# Patient Record
Sex: FEMALE | Race: WHITE | NOT HISPANIC OR LATINO
[De-identification: names, ages, dates, MRNs, and addresses within clinical notes are randomized per-mention and may not be internally consistent; named-entity substitution may affect disease eponyms.]

---

## 2020-09-17 PROBLEM — Z00.00 ENCOUNTER FOR PREVENTIVE HEALTH EXAMINATION: Status: ACTIVE | Noted: 2020-09-17

## 2020-09-18 ENCOUNTER — TRANSCRIPTION ENCOUNTER (OUTPATIENT)
Age: 65
End: 2020-09-18

## 2020-09-18 ENCOUNTER — APPOINTMENT (OUTPATIENT)
Dept: PHYSICAL MEDICINE AND REHAB | Facility: CLINIC | Age: 65
End: 2020-09-18
Payer: MEDICARE

## 2020-09-18 DIAGNOSIS — Z85.038 PERSONAL HISTORY OF OTHER MALIGNANT NEOPLASM OF LARGE INTESTINE: ICD-10-CM

## 2020-09-18 DIAGNOSIS — I10 ESSENTIAL (PRIMARY) HYPERTENSION: ICD-10-CM

## 2020-09-18 PROCEDURE — 99202 OFFICE O/P NEW SF 15 MIN: CPT

## 2020-09-18 PROCEDURE — 73502 X-RAY EXAM HIP UNI 2-3 VIEWS: CPT | Mod: RT

## 2020-09-18 RX ORDER — TAMSULOSIN HYDROCHLORIDE 0.4 MG/1
0.4 CAPSULE ORAL
Refills: 0 | Status: ACTIVE | COMMUNITY

## 2020-09-18 RX ORDER — LISINOPRIL 30 MG/1
TABLET ORAL
Refills: 0 | Status: ACTIVE | COMMUNITY

## 2020-09-18 NOTE — ASSESSMENT
[FreeTextEntry1] : 65 y.o. F w/ right lateral hip and groin pain consistent w/ gluteal tendinopathy and moderate-severe FA joint OA.  Rx meloxicam 15 mg; one tab po qd x 1-2 weeks as tolerated.  Pt. deferred Rx for P.T.  We discussed R/B/A to US guided RIGHT HIP JOINT CSI which patient has agreed to.  We will scan her lateral hip to document gluteal tendinopathy and then consider PNT vs. PRP injection.  RTC 1-2 weeks for CSI.

## 2020-09-18 NOTE — DATA REVIEWED
[Plain X-Rays] : plain X-Rays [FreeTextEntry1] : X-rays (2 views) right hip performed at today's office visit: c/w moderate-to-severe FA joint OA w/ loss of joint space and subchondral sclerosis.  No fx's.

## 2020-09-18 NOTE — HISTORY OF PRESENT ILLNESS
[FreeTextEntry1] : 65 y.o. F w/ h/o HTN and colon cancer (2018) s/p subtotal colectomy presents to office w/ c/o right hip pain radiating into groin and knee.  Pt. denies significant LBP.  Pain does not travel past knee.  Denies N/TW.  Pt. saw outside Ortho MD ([facility]) who ordered x-rays which showed "arthritis".  No NSAIDs.  Tried Aleve prn.  Pt. is doing P.T. for left frozen shoulder and is improving, but has not had P.T. for her hip.  No CSI.

## 2020-09-18 NOTE — PHYSICAL EXAM
[FreeTextEntry1] : NAD\par A&Ox3\par Non-obese\par ROM L-spine: near full forward flexion w/o pain; 20' passive extension w/o pain\par ROM Hips: slight restriction ER R HIP\par Pelvic tilt: ++\par Seated slump test: neg\par SLR: neg\par KEN's: +/- RIGHT w/ mild R HIP/GROIN PAIN\par DTR's: 2+ symmetric knees/ankles\par MMT: 5/5 b/l LE\par Sensation: SILT\par Toe & Heel Walk: Yes\par Palpation: R GT TTP (DISCORDANT); R GM TENDON INSERTION TTP (MORE CONCORDANT)\par

## 2020-10-19 ENCOUNTER — APPOINTMENT (OUTPATIENT)
Dept: PHYSICAL MEDICINE AND REHAB | Facility: CLINIC | Age: 65
End: 2020-10-19
Payer: MEDICARE

## 2020-10-19 VITALS
TEMPERATURE: 97.2 F | HEIGHT: 60 IN | BODY MASS INDEX: 25.91 KG/M2 | SYSTOLIC BLOOD PRESSURE: 155 MMHG | WEIGHT: 132 LBS | DIASTOLIC BLOOD PRESSURE: 93 MMHG | HEART RATE: 63 BPM

## 2020-10-19 PROCEDURE — 20611 DRAIN/INJ JOINT/BURSA W/US: CPT | Mod: RT

## 2020-11-10 ENCOUNTER — APPOINTMENT (OUTPATIENT)
Dept: PHYSICAL MEDICINE AND REHAB | Facility: CLINIC | Age: 65
End: 2020-11-10
Payer: MEDICARE

## 2020-11-10 VITALS
HEIGHT: 60 IN | SYSTOLIC BLOOD PRESSURE: 128 MMHG | DIASTOLIC BLOOD PRESSURE: 88 MMHG | BODY MASS INDEX: 27.09 KG/M2 | HEART RATE: 58 BPM | WEIGHT: 138 LBS

## 2020-11-10 VITALS — TEMPERATURE: 96.1 F

## 2020-11-10 PROCEDURE — 99213 OFFICE O/P EST LOW 20 MIN: CPT

## 2020-11-10 NOTE — HISTORY OF PRESENT ILLNESS
Addendum  created 03/02/17 1233 by Ren Suarez, DO    Delete clinical note       [FreeTextEntry1] : 65 y.o. F s/p USG right hip CSI with overall 80% improvement.  Report that back pain remains unchanged - still pain with navigating up and down stairs.  Pain still predominantly to right groin and glut.  Denies kennedi weakness, paresthesia, B/B incontinence, or saddle anesthesia.  Previously took meloxicam with benefit, currently takes Aleve PRN.

## 2020-11-10 NOTE — PHYSICAL EXAM
[FreeTextEntry1] : NAD\par A&Ox3\par Non-obese\par ROM L-spine: near full forward flexion w/o pain; 20' passive extension w/o pain\par ROM Hips: fluent IR/ER bilaterally - right slightly restricted compared to left with IR/ER\par Pelvic tilt: ++\par Seated slump test: neg\par SLR: neg\par KEN's: +/- RIGHT w/ mild R HIP/GROIN PAIN\par DTR's: 2+ symmetric knees/ankles\par MMT: 5/5 b/l LE\par Sensation: SILT\par Toe & Heel Walk: Yes\par Palpation: R GT TTP (DISCORDANT); R GM TENDON INSERTION TTP (MORE CONCORDANT)\par

## 2020-11-10 NOTE — ASSESSMENT
[FreeTextEntry1] : 65 y.o. F w/ right lateral hip and groin pain consistent w/ gluteal tendinopathy and moderate-severe FA joint OA.  s/p USG right HIp CSI with significant relief.  Discussed R/B/A to repeat US guided hip CSI vs. HA injection if needed.  Pt. would not be good candidate for PRP injection given advanced FA OA.  Pt. understands that she may ultimately need R KIA in future.  RTC 3 months.

## 2021-03-09 ENCOUNTER — APPOINTMENT (OUTPATIENT)
Dept: PHYSICAL MEDICINE AND REHAB | Facility: CLINIC | Age: 66
End: 2021-03-09
Payer: MEDICARE

## 2021-03-09 VITALS
HEIGHT: 60 IN | SYSTOLIC BLOOD PRESSURE: 131 MMHG | OXYGEN SATURATION: 99 % | HEART RATE: 70 BPM | BODY MASS INDEX: 26.7 KG/M2 | TEMPERATURE: 97.8 F | DIASTOLIC BLOOD PRESSURE: 80 MMHG | RESPIRATION RATE: 14 BRPM | WEIGHT: 136 LBS

## 2021-03-09 VITALS — TEMPERATURE: 97.8 F

## 2021-03-09 PROCEDURE — 99212 OFFICE O/P EST SF 10 MIN: CPT

## 2021-03-09 NOTE — ASSESSMENT
[FreeTextEntry1] : 65 y.o. F w/ right lateral hip and groin pain consistent w/ gluteal tendinopathy and moderate-severe FA joint OA s/p USG right hip CSI with good relief of sxs.  Again discussed R/B/A to repeat US guided hip CSI vs. HA injection if needed (not recommended  now).  Pt. would not be good candidate for PRP injection into FA joint given h/o advanced OA but remains option for GM tendon.  Pt. understands that she may ultimately need R KIA in future.  RTC 3 months.

## 2021-03-09 NOTE — HISTORY OF PRESENT ILLNESS
[FreeTextEntry1] : 65 y.o. F w/ h/o right lateral hip and groin pain consistent w/ gluteal tendinopathy and moderate-severe FA joint OA (s/p USG right HIp CSI 10/19/20 with significant relief) returns to office for f/u.  Pt. states that she still feels relief from October's CSI.  She continues to describe an "aching" discomfort in posterolateral right thigh.  Denies LBP or radiating right leg pain.  No N/T/B in legs/feet.

## 2021-03-09 NOTE — PHYSICAL EXAM
[FreeTextEntry1] : NAD\par A&Ox3\par Non-obese\par ROM L-spine: near full forward flexion w/o pain; 20' passive extension w/o pain\par ROM Hips: fluent IR/ER bilaterally - right slightly restricted compared to left with IR/ER\par Pelvic tilt: ++\par Seated slump test: neg\par SLR: neg\par KEN's: +/- RIGHT w/ mild R HIP/GROIN PAIN (static)\par DTR's: 2+ symmetric knees/ankles\par MMT: 5/5 b/l LE except 4/5 RIGHT HIP ABD\par Sensation: SILT\par Toe & Heel Walk: Yes\par Palpation: R SIJ NTTP; R GT TTP (DISCORDANT); R GM TENDON INSERTION TTP (MORE CONCORDANT)\par

## 2021-05-20 ENCOUNTER — TRANSCRIPTION ENCOUNTER (OUTPATIENT)
Age: 66
End: 2021-05-20

## 2021-06-11 ENCOUNTER — APPOINTMENT (OUTPATIENT)
Dept: PHYSICAL MEDICINE AND REHAB | Facility: CLINIC | Age: 66
End: 2021-06-11
Payer: MEDICARE

## 2021-06-11 VITALS
WEIGHT: 132 LBS | HEIGHT: 59 IN | DIASTOLIC BLOOD PRESSURE: 89 MMHG | TEMPERATURE: 97 F | BODY MASS INDEX: 26.61 KG/M2 | SYSTOLIC BLOOD PRESSURE: 144 MMHG | HEART RATE: 65 BPM | RESPIRATION RATE: 14 BRPM

## 2021-06-11 PROCEDURE — 99214 OFFICE O/P EST MOD 30 MIN: CPT

## 2021-06-11 NOTE — HISTORY OF PRESENT ILLNESS
[FreeTextEntry1] : 66 y.o. F w/ right lateral hip and groin pain consistent w/ gluteal tendinopathy and moderate-severe FA joint OA s/p USG right hip CSI (10/19/20) returns to office for f/u.  Hip pain is worse with prolonged standing and doing stairs.  Pain is mostly posterolateral hip not groin.  Not much LBP.  No radiating leg pain or N/T.  No recent P.T.

## 2021-06-11 NOTE — ASSESSMENT
[FreeTextEntry1] : 66 y.o. F w/ h/o right lateral hip and groin pain.  Groin pain resolved after first USG hip CSI.  Right posterolateral hip/buttock more clinically c/w SI joint pain given discordant palpatory examination and lack of GMed/min tendinosis on last US examination.   Discussed R/B/A to RIGHT SI JOINT CSI injection under fluoroscopy which patient has agreed to.  Pt. would not be good candidate for PRP injection into FA joint given h/o advanced OA but remains option for GM tendon if pt. experiences symptomatic flair there.   Pt. understands that she may ultimately need R KIA in future.  RTC 2 weeks post procedure.

## 2021-07-14 ENCOUNTER — TRANSCRIPTION ENCOUNTER (OUTPATIENT)
Age: 66
End: 2021-07-14

## 2021-07-15 ENCOUNTER — OUTPATIENT (OUTPATIENT)
Dept: OUTPATIENT SERVICES | Facility: HOSPITAL | Age: 66
LOS: 1 days | End: 2021-07-15
Payer: MEDICARE

## 2021-07-15 ENCOUNTER — APPOINTMENT (OUTPATIENT)
Dept: PHYSICAL MEDICINE AND REHAB | Facility: GI CENTER | Age: 66
End: 2021-07-15
Payer: MEDICARE

## 2021-07-15 DIAGNOSIS — M53.3 SACROCOCCYGEAL DISORDERS, NOT ELSEWHERE CLASSIFIED: ICD-10-CM

## 2021-07-15 PROCEDURE — 27096 INJECT SACROILIAC JOINT: CPT | Mod: RT

## 2021-07-15 PROCEDURE — G0260: CPT | Mod: LT

## 2021-07-15 PROCEDURE — 76000 FLUOROSCOPY <1 HR PHYS/QHP: CPT

## 2022-04-08 ENCOUNTER — APPOINTMENT (OUTPATIENT)
Dept: PHYSICAL MEDICINE AND REHAB | Facility: CLINIC | Age: 67
End: 2022-04-08
Payer: MEDICARE

## 2022-04-08 VITALS
DIASTOLIC BLOOD PRESSURE: 90 MMHG | BODY MASS INDEX: 26.61 KG/M2 | SYSTOLIC BLOOD PRESSURE: 154 MMHG | HEART RATE: 72 BPM | WEIGHT: 132 LBS | HEIGHT: 59 IN

## 2022-04-08 PROCEDURE — 73502 X-RAY EXAM HIP UNI 2-3 VIEWS: CPT | Mod: RT

## 2022-04-08 PROCEDURE — 99214 OFFICE O/P EST MOD 30 MIN: CPT

## 2022-04-08 NOTE — HISTORY OF PRESENT ILLNESS
[FreeTextEntry1] : 65 y.o. F w/ h/o moderate-severe FA joint OA (s/p USG right Hip CSI 10/19/20 with significant relief) returns to office for f/u s/p left SI joint CSI (7/15/21).  Pt. reports significant pain relief post procedure until sxs returned last month.  Pain is mostly in posterolateral hip and buttock w/ intermittent pain in right knee.  No significant groin pain.  No N/T/B.

## 2022-04-08 NOTE — ASSESSMENT
[FreeTextEntry1] : 67 y.o. F w/ h/o relatively severe R hip OA w/ resurgence of posterolateral hip, groin and knee pain.  I spent most of today's office visit (25 min) reviewing the patient's new x-rays, discussing pathogenesis and further non-operative vs. operative management.  Discussed R/B/A to short courses of meloxicam 15 mg; one tab po qd x 1-2 weeks as tolerated.  Pt's groin pain resolved after first USG R hip CSI in 2020.  I explained that pt. may have had placebo response to SI joint CSI given the severity of her underlying FA joint OA.  Discussed R/B/A to repeat US guided R hip CSI injection vs. repeat R SI joint CSI under fluoroscopy.  I feel pt. would benefit more from repeat hip CSI injection.  She is not a good candidate for PRP injection given severity of OA.  Pt. understands that she may ultimately need R KIA in near future for more definitive relief of sxs.  Pt. is in agreement with plan.  All questions answered.  RTC for US inj. R hip.

## 2022-04-08 NOTE — DATA REVIEWED
[Plain X-Rays] : plain X-Rays [FreeTextEntry1] : X-rays (2 views) Right Hip performed at today's office visit: severe FA joint OA w/ loss of joint space and subchondral sclerosis slightly progressed since last examination.\par \par US Right Lateral Hip (Oct 2020):  GLUT MED TENDON INTACT W/O EVIDENCE OF LOSS OF FIBRILLAR PATTERN, HYPOECHOGENICITY OR TEAR. NO GT BURSAL SWELLING. \par \par X-rays (2 views) Right Hip (Sept. 2020): c/w moderate-to-severe FA joint OA w/ loss of joint space and subchondral sclerosis.  No fx's.

## 2022-04-08 NOTE — PHYSICAL EXAM
[FreeTextEntry1] : NAD\par A&Ox3\par Non-obese\par ROM L-spine: near full forward flexion w/o pain; 20' passive extension w/o pain\par ROM Hips: fluent IR/ER bilaterally - right slightly restricted compared to left with IR/ER (static)\par Pelvic tilt: ++ right\par Seated slump test: neg\par SLR: neg\par KEN's: ++ RIGHT (worse since last visit)\par Thight thrust: NEG RIGHT\par DTR's: 2+ symmetric knees/ankles\par MMT: 5/5 b/l LE except 4/5 RIGHT HIP ABD\par Sensation: SILT\par Toe & Heel Walk: Yes\par Palpation: R SIJ TTP (APPEARS LESS CONCORDANT THAN PRIOR EXAMINATION); R GT NTTP (DISCORDANT) \par

## 2022-04-13 ENCOUNTER — APPOINTMENT (OUTPATIENT)
Dept: PHYSICAL MEDICINE AND REHAB | Facility: CLINIC | Age: 67
End: 2022-04-13
Payer: MEDICARE

## 2022-04-13 VITALS
DIASTOLIC BLOOD PRESSURE: 84 MMHG | TEMPERATURE: 97.4 F | HEART RATE: 70 BPM | SYSTOLIC BLOOD PRESSURE: 147 MMHG | WEIGHT: 132 LBS | HEIGHT: 59 IN | BODY MASS INDEX: 26.61 KG/M2

## 2022-04-13 DIAGNOSIS — M25.551 PAIN IN RIGHT HIP: ICD-10-CM

## 2022-04-13 PROCEDURE — 20611 DRAIN/INJ JOINT/BURSA W/US: CPT | Mod: RT

## 2022-04-13 PROCEDURE — 99214 OFFICE O/P EST MOD 30 MIN: CPT | Mod: 25

## 2022-04-13 NOTE — PROCEDURE
[de-identified] : Reason for procedure: RIGHT HIP PAIN\par \par Procedure: RIGHT HIP INJECTION US GUIDANCE\par Physician: DEREK MORSE DO\par Medication injected: 40 mg Kenalog, 2 cc Lidocaine 1%, 2 cc NS (total)\par Sedation medications: None\par Estimated blood loss: None\par Complications: None\par \par Technique:  R/B/A to USG RIGHT hip joint injection reviewed with patient. The patient is agreeable and wishes to proceed.  Signed consent form to be scanned into EMR.  The patient was placed in SUPINE position. Utilizing ultrasound, the femoral neck, femoroacetabular junction, overlying vessels and muscles were identified. The area was prepped in normal sterile fashion with Chloroprep x3.  Local anesthesia with 1% lidocaine was provided with 25-G, 1.5" needle.  After adequate anesthesia was obtained, a 22-G, 3.5" spinal needle was advanced under sonographic guidance toward the femoral neck.  After negative aspiration of heme, the above medications were injected into the joint space under direct US visualization.  The needle was then removed, band aid placed over injection site.  There were no complications.  Post injection instructions provided.  The patient noted improvement in pain and ROM after injection. \par \par

## 2022-04-13 NOTE — ASSESSMENT
[FreeTextEntry1] : 67 y.o. F w/ h/o relatively severe R hip OA w/ resurgence of posterolateral hip, groin and knee pain.  I spent most of today's office visit (30 min) reviewing and performing the patient's US guided R hip CSI, discussing pathogenesis and further non-operative vs. operative management.  Again, discussed R/B/A to short courses of meloxicam 15 mg; one tab po qd x 1-2 weeks as tolerated.  I again explained that pt. may have had placebo response to SI joint CSI given the severity of her underlying FA joint OA.  Pt. understands that she is not a good candidate for PRP injection given severity of OA.  Pt. will consider R KIA (but not w/i 6 mo's of CSI as may be increased risk of perioperative infection) for more definitive relief of sxs.  Pt. is in agreement with plan.  All questions answered.  RTC 4 weeks.

## 2022-04-13 NOTE — HISTORY OF PRESENT ILLNESS
[FreeTextEntry1] : 67 y.o. F w/ h/o relatively severe R hip OA w/ resurgence of posterolateral hip, groin and knee pain returns to office for US guided R hip CSI.

## 2022-04-13 NOTE — PHYSICAL EXAM
[FreeTextEntry1] : NAD\par A&Ox3\par Non-obese\par No significant change in today's examination\par ROM L-spine: near full forward flexion w/o pain; 20' passive extension w/o pain\par ROM Hips: fluent IR/ER bilaterally - right slightly restricted compared to left with IR/ER (static)\par Pelvic tilt: ++ right\par Seated slump test: neg\par SLR: neg\par KEN's: ++ RIGHT (worse since last visit)\par Thight thrust: NEG RIGHT\par DTR's: 2+ symmetric knees/ankles\par MMT: 5/5 b/l LE except 4/5 RIGHT HIP ABD\par Sensation: SILT\par Toe & Heel Walk: Yes\par Palpation: R SIJ TTP (APPEARS LESS CONCORDANT THAN PRIOR EXAMINATION); R GT NTTP (DISCORDANT) \par

## 2022-05-27 ENCOUNTER — APPOINTMENT (OUTPATIENT)
Dept: PHYSICAL MEDICINE AND REHAB | Facility: CLINIC | Age: 67
End: 2022-05-27
Payer: MEDICARE

## 2022-05-27 DIAGNOSIS — M16.11 UNILATERAL PRIMARY OSTEOARTHRITIS, RIGHT HIP: ICD-10-CM

## 2022-05-27 PROCEDURE — 99213 OFFICE O/P EST LOW 20 MIN: CPT

## 2022-05-27 RX ORDER — MELOXICAM 15 MG/1
15 TABLET ORAL
Qty: 30 | Refills: 1 | Status: ACTIVE | COMMUNITY
Start: 2020-09-18 | End: 1900-01-01

## 2022-05-31 PROBLEM — M16.11 PRIMARY OSTEOARTHRITIS OF RIGHT HIP: Status: ACTIVE | Noted: 2020-11-10

## 2022-05-31 NOTE — HISTORY OF PRESENT ILLNESS
[FreeTextEntry1] : 67 y.o. F w/ h/o relatively severe R hip OA w/ resurgence of posterolateral hip, groin and knee pain returns to office for f/u s/p US guided R hip CSI (4/13/22).  Pt. reports approximately 80% symptomatic improvement post procedure.  She is better able to walk w/o pain.  Able to transfer in and out of bed easier.  Still "knows when it's going to rain".  Takes meloxicam sparingly about 4x/wk prn.

## 2022-05-31 NOTE — PHYSICAL EXAM
[FreeTextEntry1] : NAD\par A&Ox3\par Non-obese\par No significant change in today's examination\par ROM L-spine: near full forward flexion w/o pain; 20' passive extension w/o pain\par ROM Hips: fluent IR/ER bilaterally - right slightly restricted compared to left with IR/ER (static)\par Pelvic tilt: ++ right\par Seated slump test: neg\par SLR: neg\par KEN's: + RIGHT (static)\par Thight thrust: NEG RIGHT\par DTR's: 2+ symmetric knees/ankles\par MMT: 5/5 b/l LE except 4/5 RIGHT HIP ABD\par Sensation: SILT\par Toe & Heel Walk: Yes\par Palpation: R SIJ TTP (APPEARS LESS CONCORDANT THAN PRIOR EXAMINATION); R GT NTTP (DISCORDANT) \par

## 2022-05-31 NOTE — ASSESSMENT
[FreeTextEntry1] : 67 y.o. F w/ h/o relatively severe R hip OA doing well s/p repeat US guided R hip CSI.  I spent most of today's office visit (20 min) discussing pathogenesis and further non-operative vs. operative management.  Again, discussed R/B/A to short courses of meloxicam 15 mg; one tab po qd x 1-2 weeks as tolerated.  Pt. is not a good candidate for HA or PRP injection given severity of OA.  Pt. will consider R KIA at some point (but not w/i 6 mo's of CSI as may be increased risk of perioperative infection) for more definitive relief of sxs.  Pt. is in agreement with plan.  All questions answered.  RTC 3-4 months.

## 2022-09-30 ENCOUNTER — APPOINTMENT (OUTPATIENT)
Dept: PHYSICAL MEDICINE AND REHAB | Facility: CLINIC | Age: 67
End: 2022-09-30

## 2022-11-23 ENCOUNTER — NON-APPOINTMENT (OUTPATIENT)
Age: 67
End: 2022-11-23

## 2023-05-26 NOTE — PHYSICAL EXAM
[FreeTextEntry1] : NAD\par A&Ox3\par Non-obese\par ROM L-spine: near full forward flexion w/o pain; 20' passive extension w/o pain\par ROM Hips: fluent IR/ER bilaterally - right slightly restricted compared to left with IR/ER\par Pelvic tilt: ++\par Seated slump test: neg\par SLR: neg\par KEN's: NEG RIGHT (IMPROVED)\par Thight thrust: NEG RIGHT\par DTR's: 2+ symmetric knees/ankles\par MMT: 5/5 b/l LE except 4/5 RIGHT HIP ABD\par Sensation: SILT\par Toe & Heel Walk: Yes\par Palpation: R SIJ VTTP (NEWER - CONCORDANT); R GT TTP (DISCORDANT); R GM TENDON INSERTION MILDLY TTP (LESS CONCORDANT)\par  weight-bearing as tolerated

## 2024-05-08 ENCOUNTER — APPOINTMENT (OUTPATIENT)
Dept: OPHTHALMOLOGY | Facility: CLINIC | Age: 69
End: 2024-05-08
Payer: MEDICARE

## 2024-05-08 ENCOUNTER — NON-APPOINTMENT (OUTPATIENT)
Age: 69
End: 2024-05-08

## 2024-05-08 PROCEDURE — 92004 COMPRE OPH EXAM NEW PT 1/>: CPT

## 2024-05-08 PROCEDURE — 92134 CPTRZ OPH DX IMG PST SGM RTA: CPT

## 2024-09-16 ENCOUNTER — APPOINTMENT (OUTPATIENT)
Dept: ORTHOPEDIC SURGERY | Facility: CLINIC | Age: 69
End: 2024-09-16

## 2024-09-16 VITALS
SYSTOLIC BLOOD PRESSURE: 158 MMHG | BODY MASS INDEX: 24.74 KG/M2 | HEART RATE: 86 BPM | HEIGHT: 60 IN | DIASTOLIC BLOOD PRESSURE: 91 MMHG | WEIGHT: 126 LBS

## 2024-09-16 DIAGNOSIS — M17.12 UNILATERAL PRIMARY OSTEOARTHRITIS, LEFT KNEE: ICD-10-CM

## 2024-09-16 DIAGNOSIS — S83.242D OTHER TEAR OF MEDIAL MENISCUS, CURRENT INJURY, LEFT KNEE, SUBSEQUENT ENCOUNTER: ICD-10-CM

## 2024-09-16 DIAGNOSIS — S83.282A OTHER TEAR OF LATERAL MENISCUS, CURRENT INJURY, LEFT KNEE, INITIAL ENCOUNTER: ICD-10-CM

## 2024-09-16 PROCEDURE — 99203 OFFICE O/P NEW LOW 30 MIN: CPT

## 2024-09-16 PROCEDURE — 73650 X-RAY EXAM OF HEEL: CPT | Mod: LT

## 2024-09-19 PROBLEM — M17.12 PRIMARY OSTEOARTHRITIS OF LEFT KNEE: Status: ACTIVE | Noted: 2024-09-16

## 2024-09-19 PROBLEM — S83.282A TEAR OF LATERAL MENISCUS OF LEFT KNEE, CURRENT, UNSPECIFIED TEAR TYPE, INITIAL ENCOUNTER: Status: ACTIVE | Noted: 2024-09-19

## 2024-09-19 PROBLEM — S83.242D ACUTE MEDIAL MENISCAL TEAR, LEFT, SUBSEQUENT ENCOUNTER: Status: ACTIVE | Noted: 2024-09-16

## 2024-09-24 NOTE — PHYSICAL EXAM
[de-identified] : Right knee: Knee: Range of Motion in Degrees	 	                  Claimant:	Normal:	 Flexion Active	  135 	                135-degrees	 Flexion Passive	  135	                135-degrees	 Extension Active	  0-5	                0-5-degrees	 Extension Passive	  0-5	                0-5-degrees	  No weakness to flexion/extension.  No evidence of instability in the AP plane or varus or valgus stress.  Negative  Lachman.  Negative pivot shift.  Negative anterior drawer test.  Negative posterior drawer test.  Negative Sandro.  Negative Apley grind.  No medial or lateral joint line tenderness.  No tenderness over the medial and lateral facet of the patella.  No patellofemoral crepitations.  No lateral tilting patella.  No patellar apprehension.  No crepitation in the medial and lateral femoral condyle.  No proximal or distal swelling, edema or tenderness.  No gross motor or sensory deficits.  No intra-articular swelling.  2+ DP and PT pulses. No varus or valgus malalignment.  Skin is intact.  No rashes, scars or lesions.     Left knee: Knee: Range of Motion in Degrees	 	                  Claimant:	Normal:	 Flexion Active	  135 	                135-degrees	 Flexion Passive	  135	                135-degrees	 Extension Active	  0-5	                0-5-degrees	 Extension Passive	  0-5	                0-5-degrees	  No weakness to flexion/extension. No evidence of instability in the AP plane or varus or valgus stress.  Negative  Lachman.  Negative pivot shift.  Negative anterior drawer test.  Negative posterior drawer test. Positive lateral joint line tenderness.  Positive Sandro.  Positive Apley grind.   Positive tenderness over the medial and lateral facet of the patella.  Positive patellofemoral crepitations.  No lateral tilting patella.  No patella apprehension.  Positive crepitation in the medial and lateral femoral condyle.  No proximal or distal swelling, edema or tenderness.  No gross motor or sensory deficits. Mild intra-articular swelling.  2+ DP and PT pulses. No varus or valgus malalignment.  Skin is intact.  No rashes, scars or lesions.  [de-identified] : Gait: Slightly antalgic to antalgic.  Station: Normal  [de-identified] : Appearance: Well-developed, well-nourished female  in no acute distress.  [de-identified] : Radiographs taken in the office today, one to two views of the left knee, show moderate degenerative change.

## 2024-09-24 NOTE — HISTORY OF PRESENT ILLNESS
[2] : a current pain level of 2/10 [de-identified] : Bessy comes in today with complaints referable to her left knee.  It has been going on for about a month.  She points to the anterolateral aspect of the knee as the source of her pain.  This injury is not work related.  The patient states the pain is localized.  The patient describes the pain as sharp.  The patient states sitting too long makes the symptoms worse.

## 2024-09-24 NOTE — DISCUSSION/SUMMARY
[de-identified] : The patient presents with osteoarthritis with possible meniscal tear, left knee.  At this time, I recommend a course rest, topical anti-inflammatory, and physical therapy.  She will be reassessed in four weeks for a possible injection.

## 2024-09-24 NOTE — DISCUSSION/SUMMARY
[de-identified] : The patient presents with osteoarthritis with possible meniscal tear, left knee.  At this time, I recommend a course rest, topical anti-inflammatory, and physical therapy.  She will be reassessed in four weeks for a possible injection.

## 2024-09-24 NOTE — PHYSICAL EXAM
[de-identified] : Right knee: Knee: Range of Motion in Degrees	 	                  Claimant:	Normal:	 Flexion Active	  135 	                135-degrees	 Flexion Passive	  135	                135-degrees	 Extension Active	  0-5	                0-5-degrees	 Extension Passive	  0-5	                0-5-degrees	  No weakness to flexion/extension.  No evidence of instability in the AP plane or varus or valgus stress.  Negative  Lachman.  Negative pivot shift.  Negative anterior drawer test.  Negative posterior drawer test.  Negative Sandro.  Negative Apley grind.  No medial or lateral joint line tenderness.  No tenderness over the medial and lateral facet of the patella.  No patellofemoral crepitations.  No lateral tilting patella.  No patellar apprehension.  No crepitation in the medial and lateral femoral condyle.  No proximal or distal swelling, edema or tenderness.  No gross motor or sensory deficits.  No intra-articular swelling.  2+ DP and PT pulses. No varus or valgus malalignment.  Skin is intact.  No rashes, scars or lesions.     Left knee: Knee: Range of Motion in Degrees	 	                  Claimant:	Normal:	 Flexion Active	  135 	                135-degrees	 Flexion Passive	  135	                135-degrees	 Extension Active	  0-5	                0-5-degrees	 Extension Passive	  0-5	                0-5-degrees	  No weakness to flexion/extension. No evidence of instability in the AP plane or varus or valgus stress.  Negative  Lachman.  Negative pivot shift.  Negative anterior drawer test.  Negative posterior drawer test. Positive lateral joint line tenderness.  Positive Sandro.  Positive Apley grind.   Positive tenderness over the medial and lateral facet of the patella.  Positive patellofemoral crepitations.  No lateral tilting patella.  No patella apprehension.  Positive crepitation in the medial and lateral femoral condyle.  No proximal or distal swelling, edema or tenderness.  No gross motor or sensory deficits. Mild intra-articular swelling.  2+ DP and PT pulses. No varus or valgus malalignment.  Skin is intact.  No rashes, scars or lesions.  [de-identified] : Gait: Slightly antalgic to antalgic.  Station: Normal  [de-identified] : Appearance: Well-developed, well-nourished female  in no acute distress.  [de-identified] : Radiographs taken in the office today, one to two views of the left knee, show moderate degenerative change.

## 2024-09-24 NOTE — DISCUSSION/SUMMARY
[de-identified] : The patient presents with osteoarthritis with possible meniscal tear, left knee.  At this time, I recommend a course rest, topical anti-inflammatory, and physical therapy.  She will be reassessed in four weeks for a possible injection.

## 2024-09-24 NOTE — HISTORY OF PRESENT ILLNESS
[2] : a current pain level of 2/10 [de-identified] : Bessy comes in today with complaints referable to her left knee.  It has been going on for about a month.  She points to the anterolateral aspect of the knee as the source of her pain.  This injury is not work related.  The patient states the pain is localized.  The patient describes the pain as sharp.  The patient states sitting too long makes the symptoms worse.

## 2024-09-24 NOTE — CONSULT LETTER
[Dear  ___] : Dear  [unfilled], [Consult Letter:] : I had the pleasure of evaluating your patient, [unfilled]. [Please see my note below.] : Please see my note below. [Consult Closing:] : Thank you very much for allowing me to participate in the care of this patient.  If you have any questions, please do not hesitate to contact me. [Sincerely,] : Sincerely, [FreeTextEntry3] : Wai Agosto, III, MD [facility]att

## 2024-09-24 NOTE — PHYSICAL EXAM
[de-identified] : Right knee: Knee: Range of Motion in Degrees	 	                  Claimant:	Normal:	 Flexion Active	  135 	                135-degrees	 Flexion Passive	  135	                135-degrees	 Extension Active	  0-5	                0-5-degrees	 Extension Passive	  0-5	                0-5-degrees	  No weakness to flexion/extension.  No evidence of instability in the AP plane or varus or valgus stress.  Negative  Lachman.  Negative pivot shift.  Negative anterior drawer test.  Negative posterior drawer test.  Negative Sandro.  Negative Apley grind.  No medial or lateral joint line tenderness.  No tenderness over the medial and lateral facet of the patella.  No patellofemoral crepitations.  No lateral tilting patella.  No patellar apprehension.  No crepitation in the medial and lateral femoral condyle.  No proximal or distal swelling, edema or tenderness.  No gross motor or sensory deficits.  No intra-articular swelling.  2+ DP and PT pulses. No varus or valgus malalignment.  Skin is intact.  No rashes, scars or lesions.     Left knee: Knee: Range of Motion in Degrees	 	                  Claimant:	Normal:	 Flexion Active	  135 	                135-degrees	 Flexion Passive	  135	                135-degrees	 Extension Active	  0-5	                0-5-degrees	 Extension Passive	  0-5	                0-5-degrees	  No weakness to flexion/extension. No evidence of instability in the AP plane or varus or valgus stress.  Negative  Lachman.  Negative pivot shift.  Negative anterior drawer test.  Negative posterior drawer test. Positive lateral joint line tenderness.  Positive Sandro.  Positive Apley grind.   Positive tenderness over the medial and lateral facet of the patella.  Positive patellofemoral crepitations.  No lateral tilting patella.  No patella apprehension.  Positive crepitation in the medial and lateral femoral condyle.  No proximal or distal swelling, edema or tenderness.  No gross motor or sensory deficits. Mild intra-articular swelling.  2+ DP and PT pulses. No varus or valgus malalignment.  Skin is intact.  No rashes, scars or lesions.  [de-identified] : Gait: Slightly antalgic to antalgic.  Station: Normal  [de-identified] : Appearance: Well-developed, well-nourished female  in no acute distress.  [de-identified] : Radiographs taken in the office today, one to two views of the left knee, show moderate degenerative change.

## 2024-09-24 NOTE — ADDENDUM
[FreeTextEntry1] : This note was written by Odalys Welch on 09/19/2024 acting as scribe for Wai Agosto III, MD

## 2024-09-24 NOTE — HISTORY OF PRESENT ILLNESS
[2] : a current pain level of 2/10 [de-identified] : Bessy comes in today with complaints referable to her left knee.  It has been going on for about a month.  She points to the anterolateral aspect of the knee as the source of her pain.  This injury is not work related.  The patient states the pain is localized.  The patient describes the pain as sharp.  The patient states sitting too long makes the symptoms worse.

## 2024-11-06 ENCOUNTER — NON-APPOINTMENT (OUTPATIENT)
Age: 69
End: 2024-11-06

## 2024-11-06 ENCOUNTER — APPOINTMENT (OUTPATIENT)
Dept: OPHTHALMOLOGY | Facility: CLINIC | Age: 69
End: 2024-11-06
Payer: MEDICARE

## 2024-11-06 PROCEDURE — 92134 CPTRZ OPH DX IMG PST SGM RTA: CPT

## 2024-11-06 PROCEDURE — 92014 COMPRE OPH EXAM EST PT 1/>: CPT
